# Patient Record
Sex: FEMALE | Race: BLACK OR AFRICAN AMERICAN
[De-identification: names, ages, dates, MRNs, and addresses within clinical notes are randomized per-mention and may not be internally consistent; named-entity substitution may affect disease eponyms.]

---

## 2022-03-18 PROBLEM — G47.00 INSOMNIA: Status: ACTIVE | Noted: 2017-01-05

## 2022-03-19 PROBLEM — G47.419 NARCOLEPSY: Status: ACTIVE | Noted: 2021-03-29

## 2022-06-10 ENCOUNTER — OFFICE VISIT (OUTPATIENT)
Dept: INTERNAL MEDICINE CLINIC | Facility: CLINIC | Age: 27
End: 2022-06-10
Payer: COMMERCIAL

## 2022-06-10 ENCOUNTER — NURSE ONLY (OUTPATIENT)
Dept: INTERNAL MEDICINE CLINIC | Facility: CLINIC | Age: 27
End: 2022-06-10

## 2022-06-10 VITALS
HEIGHT: 61 IN | HEART RATE: 67 BPM | OXYGEN SATURATION: 99 % | WEIGHT: 114 LBS | SYSTOLIC BLOOD PRESSURE: 100 MMHG | BODY MASS INDEX: 21.52 KG/M2 | TEMPERATURE: 97.5 F | DIASTOLIC BLOOD PRESSURE: 60 MMHG

## 2022-06-10 DIAGNOSIS — G47.419 PRIMARY NARCOLEPSY WITHOUT CATAPLEXY: ICD-10-CM

## 2022-06-10 DIAGNOSIS — G47.419 PRIMARY NARCOLEPSY WITHOUT CATAPLEXY: Primary | ICD-10-CM

## 2022-06-10 LAB — ABO + RH BLD: NORMAL

## 2022-06-10 PROCEDURE — 99214 OFFICE O/P EST MOD 30 MIN: CPT | Performed by: NURSE PRACTITIONER

## 2022-06-10 RX ORDER — TRIFAROTENE 50 UG/G
CREAM TOPICAL
COMMUNITY
Start: 2022-06-03

## 2022-06-10 RX ORDER — SOLRIAMFETOL 75 MG/1
1 TABLET, FILM COATED ORAL DAILY
COMMUNITY

## 2022-06-10 ASSESSMENT — ENCOUNTER SYMPTOMS
EYE DISCHARGE: 0
ABDOMINAL PAIN: 0
SINUS PAIN: 0
COUGH: 0
COLOR CHANGE: 0
EYE PAIN: 0
DIARRHEA: 0
ABDOMINAL DISTENTION: 0
NAUSEA: 0
CONSTIPATION: 0
SHORTNESS OF BREATH: 0
APNEA: 0
EYE ITCHING: 0
EYE REDNESS: 0
BACK PAIN: 0

## 2022-06-10 ASSESSMENT — PATIENT HEALTH QUESTIONNAIRE - PHQ9
1. LITTLE INTEREST OR PLEASURE IN DOING THINGS: 0
SUM OF ALL RESPONSES TO PHQ QUESTIONS 1-9: 0
SUM OF ALL RESPONSES TO PHQ9 QUESTIONS 1 & 2: 0
SUM OF ALL RESPONSES TO PHQ QUESTIONS 1-9: 0
2. FEELING DOWN, DEPRESSED OR HOPELESS: 0
SUM OF ALL RESPONSES TO PHQ QUESTIONS 1-9: 0
SUM OF ALL RESPONSES TO PHQ QUESTIONS 1-9: 0

## 2022-06-10 NOTE — PROGRESS NOTES
[facility]  [address] [place] [id number]      PROGRESS NOTE    SUBJECTIVE:   Vasquez Reyes is a 23 y.o. female seen for a follow up visit regarding the following chief complaint:     Chief Complaint   Patient presents with    Insomnia     9 month follow-up on narcolepsy. HPI     Patient presents for follow up sleep disorder. 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 She is compliant with this and symptoms seem improved somewhat on regimen. She continues to have daytime drowsiness and sleepiness. She falls asleep during the day hours still. She was recently referred to [facility] lung and critical care for further evaluation by her ENT for [facility] medication and is scheduled September for follow up. 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 She is working at [employer] currently at [employer]. Due for labs. She is very active and enjoys cycling, running and hot yoga. She does not drink caffeine. Current Outpatient Medications   Medication Sig Dispense Refill    AKLIEF 0.005 % CREA       Solriamfetol HCl (SUNOSI) 75 MG TABS Take 1 tablet by mouth daily      amoxicillin (AMOXIL) 500 MG capsule Take 500 mg by mouth daily      Benzoyl Peroxide (BENZAC AC) 10 % external wash WASH CHEST AND BACK EVERY DAY. ALLOW TO SIT 10 - 15 MINUTES PRIOR TO RINSING      clindamycin (CLINDAGEL) 1 % gel Apply topically 2 times daily       No current facility-administered medications for this visit. No Known Allergies    Past Medical History:   Diagnosis Date    Narcolepsy 03/2021     History reviewed. No pertinent surgical history.   Family History   Problem Relation Age of Onset    Elevated Lipids Mother     Hypertension Father      Social History     Tobacco Use    Smoking status: Never Smoker    Smokeless tobacco: Never Used   Substance Use Topics    Alcohol use: Yes     Comment: Socially       Review of Systems   Constitutional: Positive for fatigue. Negative for activity change, appetite change, chills, fever and unexpected weight change. HENT: Negative for congestion, ear pain and sinus pain. Eyes: Negative for pain, discharge, redness and itching. Respiratory: Negative for apnea, cough and shortness of breath. Cardiovascular: Negative for chest pain, palpitations and leg swelling. Gastrointestinal: Negative for abdominal distention, abdominal pain, constipation, diarrhea and nausea. Endocrine: Negative for cold intolerance and heat intolerance. Genitourinary: Negative for difficulty urinating, dysuria, enuresis and urgency. Musculoskeletal: Negative for arthralgias, back pain, joint swelling, myalgias and neck pain. Skin: Negative for color change and rash. Neurological: Negative for dizziness, weakness and headaches. Psychiatric/Behavioral: Negative for behavioral problems and sleep disturbance. The patient is not nervous/anxious. OBJECTIVE:  /60 (Site: Left Upper Arm, Position: Sitting, Cuff Size: Small Adult)   Pulse 67   Temp 97.5 °F (36.4 °C) (Temporal)   Ht 5' 1\" (1.549 m)   Wt 114 lb (51.7 kg)   SpO2 99%   BMI 21.54 kg/m²      Physical Exam  Constitutional:       General: She is not in acute distress. Appearance: Normal appearance. She is not ill-appearing or toxic-appearing. Cardiovascular:      Rate and Rhythm: Normal rate and regular rhythm. Pulmonary:      Effort: Pulmonary effort is normal. No respiratory distress. Skin:     General: Skin is warm and dry. Neurological:      Mental Status: She is alert. Mental status is at baseline. Psychiatric:         Mood and Affect: Mood normal.         Behavior: Behavior normal.         Thought Content:  Thought content normal.           ASSESSMENT and PLAN  Daniel New was seen today for insomnia. Diagnoses and all orders for this visit:    Primary narcolepsy without cataplexy  -     Vitamin D 25 Hydroxy; Future  -     TSH; Future  -     T4, Free; Future  -     CBC with Auto Differential; Future  -     Comprehensive Metabolic Panel; Future  -     Lipid Panel; Future  -     ABO/Rh; Future  -     Vitamin B12; Future    We discussed following with lung specialist as scheduled for continuation of sunosi as this seems to be managing narcolepsy fairly. We will obtain routine labs today and call with results. Greater than 50% of this 25 min visit was spent counseling the patient about test results, prognosis, importance of compliance, education about disease process, benefits of medications, instructions for management of acute symptoms, and follow up plans. Follow-up and Dispositions    · Return for labs today; .          Cheryl Montero NP, APRN - CNP

## 2022-06-11 LAB
25(OH)D3 SERPL-MCNC: 39.8 NG/ML (ref 30–100)
ALBUMIN SERPL-MCNC: 4.1 G/DL (ref 3.5–5)
ALBUMIN/GLOB SERPL: 1.2 {RATIO} (ref 1.2–3.5)
ALP SERPL-CCNC: 75 U/L (ref 50–136)
ALT SERPL-CCNC: 39 U/L (ref 12–65)
ANION GAP SERPL CALC-SCNC: 6 MMOL/L (ref 7–16)
AST SERPL-CCNC: 33 U/L (ref 15–37)
BASOPHILS # BLD: 0 K/UL (ref 0–0.2)
BASOPHILS NFR BLD: 1 % (ref 0–2)
BILIRUB SERPL-MCNC: 0.7 MG/DL (ref 0.2–1.1)
BUN SERPL-MCNC: 12 MG/DL (ref 6–23)
CALCIUM SERPL-MCNC: 10.2 MG/DL (ref 8.3–10.4)
CHLORIDE SERPL-SCNC: 105 MMOL/L (ref 98–107)
CHOLEST SERPL-MCNC: 217 MG/DL
CO2 SERPL-SCNC: 29 MMOL/L (ref 21–32)
CREAT SERPL-MCNC: 0.8 MG/DL (ref 0.6–1)
DIFFERENTIAL METHOD BLD: ABNORMAL
EOSINOPHIL # BLD: 0 K/UL (ref 0–0.8)
EOSINOPHIL NFR BLD: 0 % (ref 0.5–7.8)
ERYTHROCYTE [DISTWIDTH] IN BLOOD BY AUTOMATED COUNT: 12.4 % (ref 11.9–14.6)
GLOBULIN SER CALC-MCNC: 3.3 G/DL (ref 2.3–3.5)
GLUCOSE SERPL-MCNC: 75 MG/DL (ref 65–100)
HCT VFR BLD AUTO: 40.8 % (ref 35.8–46.3)
HDLC SERPL-MCNC: 71 MG/DL (ref 40–60)
HDLC SERPL: 3.1 {RATIO}
HGB BLD-MCNC: 13.3 G/DL (ref 11.7–15.4)
IMM GRANULOCYTES # BLD AUTO: 0 K/UL (ref 0–0.5)
IMM GRANULOCYTES NFR BLD AUTO: 0 % (ref 0–5)
LDLC SERPL CALC-MCNC: 136.6 MG/DL
LYMPHOCYTES # BLD: 1.5 K/UL (ref 0.5–4.6)
LYMPHOCYTES NFR BLD: 34 % (ref 13–44)
MCH RBC QN AUTO: 30.1 PG (ref 26.1–32.9)
MCHC RBC AUTO-ENTMCNC: 32.6 G/DL (ref 31.4–35)
MCV RBC AUTO: 92.3 FL (ref 79.6–97.8)
MONOCYTES # BLD: 0.4 K/UL (ref 0.1–1.3)
MONOCYTES NFR BLD: 8 % (ref 4–12)
NEUTS SEG # BLD: 2.6 K/UL (ref 1.7–8.2)
NEUTS SEG NFR BLD: 57 % (ref 43–78)
NRBC # BLD: 0 K/UL (ref 0–0.2)
PLATELET # BLD AUTO: 211 K/UL (ref 150–450)
PMV BLD AUTO: 12 FL (ref 9.4–12.3)
POTASSIUM SERPL-SCNC: 4.2 MMOL/L (ref 3.5–5.1)
PROT SERPL-MCNC: 7.4 G/DL (ref 6.3–8.2)
RBC # BLD AUTO: 4.42 M/UL (ref 4.05–5.2)
SODIUM SERPL-SCNC: 140 MMOL/L (ref 136–145)
T4 FREE SERPL-MCNC: 0.9 NG/DL (ref 0.9–1.8)
TRIGL SERPL-MCNC: 47 MG/DL (ref 35–150)
TSH, 3RD GENERATION: 0.76 UIU/ML (ref 0.36–3.74)
VIT B12 SERPL-MCNC: 1996 PG/ML (ref 193–986)
VLDLC SERPL CALC-MCNC: 9.4 MG/DL (ref 6–23)
WBC # BLD AUTO: 4.5 K/UL (ref 4.3–11.1)

## 2022-10-14 DIAGNOSIS — G47.419 NARCOLEPSY WITHOUT CATAPLEXY: Primary | ICD-10-CM

## 2022-10-14 DIAGNOSIS — G47.10 HYPERSOMNIA: ICD-10-CM

## 2022-12-01 NOTE — PROGRESS NOTES
[facility]  [address], [address] [place] [address] Court, [address] [facility]  [phone]    Patient Name:  Satish Guan  YOB: 1995      Office Visit 12/2/2022    CHIEF COMPLAINT:    Chief Complaint   Patient presents with    New Patient    Sleep Study    Results       HISTORY OF PRESENT ILLNESS:      The patient present in outpatient consultation at the request of Dr. Hassel Hammans, DOMIINC-CNP for evaluation and management of hypersomnia and possible sleep apnea    The patient underwent a diagnostic polysomnography last year in [place] because of symptoms including snoring, witnessed apneas, morning headaches, daytime sleepiness, and waking up with a dry mouth. Additional symptoms include grinding her teeth, have the tendency to doze off easily in the daytime. There is no history of cataplexy, hypnagogic hallucinations, or sleep paralysis. In addition, there is history of frequent leg movements, kicking at night, or an inability to keep the legs still. The diagnostic polysomnography was notable for a respiratory disturbance index of 7.0/hour but her AHI was 0.0. Oxygen saturations are low as 95% were noted. She had REM latency of 56 minutes of that study. Significant cardiac arrhythmias were not evident. The patient was noted to have in 5/hour limb movements with a limb movement arousal index being about 1.5/hour. A subsequent multiple sleep latency test was done which indicated she had mean sleep latency of 3 minutes and 44 seconds with a 3 sleep onset REM naps. She was started on modafinil and later on changed to [facility] which she is currently using on a daily basis with the dosage of 150 mg daily. She indicated that her daytime sleepiness is improving somewhat but she continues to have significant daytime sleepiness as documented by Philip score of 23 out of 24 today.   She stated that she works as an  she stated that her symptoms of snoring and witnessed apnea are getting worse and interestingly she never had any problem with her sleep when she was in high school or even in college and most of the symptoms started about a year or 2 ago. She goes to sleep around 10:30 PM and wake up around 6:30 AM daily. She denies any problem falling asleep or staying asleep. Indicated to her that her previous evaluation clearly suggest narcolepsy and will need to consider further treatment of that by using Xywav and adjunctive with the [facility]. At the same time we need to make sure she does not have underlying sleep apnea at least mild degree before we start Xywav. If she is found to have mild sleep apnea we will consider oral appliance as an alternative since she had history of bruxism and will be optimal treatment to treat both disorders. In fact, she has seen Dr. Mckeon I think in this regard and he told that she need to have documentation of sleep apnea before proceeding with any treatment with oral appliance I will provide her with a packet insert for Xywav to review with. She agreed to proceed with this plan at this point and we will refill Sunosi today. Sleepiness Scale:     Sitting and reading 3    Watching TV 3    Sitting inactive in a public place 3    As a passenger in a car for an hour without a break 3    Lying down to rest in the afternoon when circumstances Permits 3    Sitting and talking to someone 2    Sitting quietly after lunch without alcohol 3    In a car, while stopped for a few minutes 3    Total : 23/24     Past Medical History:   Diagnosis Date    Narcolepsy 03/2021       Patient Active Problem List   Diagnosis    Insomnia    Narcolepsy    Suspected sleep apnea    Bruxism, sleep-related    PLMD (periodic limb movement disorder)       History reviewed. No pertinent surgical history.     [unfilled]    Social History     Socioeconomic History    Marital status: Single     Spouse name: Not on file    Number of children: Not on file Years of education: Not on file    Highest education level: Not on file   Occupational History    Not on file   Tobacco Use    Smoking status: Never    Smokeless tobacco: Never   Vaping Use    Vaping Use: Never used   Substance and Sexual Activity    Alcohol use: Yes     Comment: Socially    Drug use: Never    Sexual activity: Not Currently   Other Topics Concern    Not on file   Social History Narrative    Not on file     Social Determinants of Health     Financial Resource Strain: Not on file   Food Insecurity: Not on file   Transportation Needs: Not on file   Physical Activity: Not on file   Stress: Not on file   Social Connections: Not on file   Intimate Partner Violence: Not on file   Housing Stability: Not on file         Family History   Problem Relation Age of Onset    Elevated Lipids Mother     Hypertension Father          No Known Allergies      Current Outpatient Medications   Medication Sig    Solriamfetol HCl 150 MG TABS Take 150 mg by mouth daily    AKLIEF 0.005 % CREA     amoxicillin (AMOXIL) 500 MG capsule Take 500 mg by mouth daily    Benzoyl Peroxide (BENZAC AC) 10 % external wash WASH CHEST AND BACK EVERY DAY. ALLOW TO SIT 10 - 15 MINUTES PRIOR TO RINSING    clindamycin (CLINDAGEL) 1 % gel Apply topically 2 times daily     No current facility-administered medications for this visit. REVIEW OF SYSTEMS:   CONSTITUTIONAL:   There is no history of fever, chills, night sweats, weight loss, weight gain, persistent fatigue, or lethargy/hypersomnolence. EYES:   Denies problems with eye pain, erythema, blurred vision, or visual field loss. ENTM:   Denies history of tinnitus, epistaxis, sore throat, hoarseness, or dysphonia. LYMPH:   Denies swollen glands. CARDIAC:   No chest pain, pressure, discomfort, palpitations, orthopnea, murmurs, or edema. GI:   No dysphagia, heartburn reflux, nausea/vomiting, diarrhea, abdominal pain, or bleeding.    :   Denies history of dysuria, hematuria, polyuria, or decreased urine output. MS:   No history of myalgias, arthralgias, bone pain, or muscle cramps. SKIN:   No history of rashes, jaundice, cyanosis, nodules, or ulcers. ENDO:   Negative for heat or cold intolerance. No history of DM. PSYCH:   Negative for anxiety, depression, insomnia, hallucinations. NEURO:   There is no history of AMS, persistent headache, decreased level of consciousness, seizures, or motor or sensory deficits. PHYSICAL EXAM:    Vitals:    12/02/22 0858   BP: 104/72   Pulse: 60   Resp: 14   Temp: 97 °F (36.1 °C)   SpO2: 100%        GENERAL APPEARANCE:   The patient is normal weight and in no respiratory distress. HEENT:   PERRL. Conjunctivae unremarkable. Nasal mucosa is without epistaxis, exudate, or polyps. Gums and dentition are unremarkable. There is  oropharyngeal narrowing. She is Mallampati 2   NECK/LYMPHATIC:   Symmetrical with no elevation of jugular venous pulsation. Trachea midline. No thyroid enlargement. No cervical adenopathy. LUNGS:   Normal respiratory effort with symmetrical lung expansion. Breath sounds are diminished in the bases. HEART:   There is a regular rate and rhythm. No murmur, rub, or gallop. There is no edema in the lower extremities. ABDOMEN:   Soft and non-tender. No hepatosplenomegaly. Bowel sounds are normal.     SKIN:   There are no rashes, cyanosis, jaundice, or ecchymosis present. EXTREMITIES:   The extremities are unremarkable without clubbing, cyanosis, joint inflammation, degenerative, or ischemic change. MUSCULOSKELETAL:   There is no abnormal tone, muscle atrophy, or abnormal movement present. NEURO:   The patient is alert and oriented to person, place, and time. Memory appears intact and mood is normal.  No gross sensorimotor deficits are present. DIAGNOSTIC TESTS:  Nps 3/2/21       ASSESSMENT:  (Medical Decision Making)         ICD-10-CM    1.  Primary narcolepsy without cataplexy ,    Her previous evaluation suggest narcolepsy without cataplexy and she has been treated with Sunosi with reasonable good response although not with resolution of her daytime sleepiness. It is certainly require further treatment with Xywav along with correcting any other sleep disorder including possible sleep apnea and bruxism. We will proceed with in lab study to exclude at least mild sleep apnea since her previous study showed definite upper airway resistance syndrome with elevated RDI and ongoing daytime sleepiness. She will be provided with the proper handout for [facility] for review in the meantime and will maintain Sunosi at 150 mg daily and this will be renewed G47.419 Solriamfetol HCl 150 MG TABS      2. Suspected sleep apnea, will proceed with in lab study to exclude sleep apnea and proceed with the treatment using oral appliance if this is positive for sleep apnea. R29.818 Ambulatory Referral to Sleep Studies      3. Bruxism, sleep-related, likely aggravated by possible sleep apnea and other sleep disruption G47.63       4. PLMD (periodic limb movement disorder), this was mild with no significant arousal and no signs of restless leg syndrome. G47.61              PLAN:    The patient be scheduled for in lab diagnostic sleep study to rule out sleep apnea.   If she has sleep apnea we will proceed with oral appliance evaluation with Dr. rToy Lombardi since she has seen him previously to consider oral appliance as a treatment modality for both sleep apnea and bruxism    Discussed with her other treatment options for narcolepsy including the addition of Xywav at nighttime to improve her nighttime sleep and and sleep quality overall to decrease her daytime sleepiness    Refill Sunosi    Provided the patient with proper sleep hygiene and education material on Xywav and hypersomnia    She will maintain her follow-up with the primary care provider    All questions and concerns were addressed according to her satisfaction    She will return to the sleep center in 4 months or sooner if needed      Orders Placed This Encounter   Procedures    Ambulatory Referral to Sleep Studies     Referral Priority:   Urgent     Referral Type:   Consult for Advice and Opinion     Referral Reason:   Specialty Services Required     Number of Visits Requested:   1                 Orders Placed This Encounter   Medications    Solriamfetol HCl 150 MG TABS     Sig: Take 150 mg by mouth daily     Dispense:  30 tablet     Refill:  3           Over 50% of today's office visit was spent in face to face time reviewing test results, prognosis, importance of compliance, education about disease process, benefits of medications, instructions for management of acute flare-ups, and follow up plans. Total face to face time spent with patient and charting was 60 minutes.     Pasquale Bravo MD  Electronically signed

## 2022-12-02 ENCOUNTER — OFFICE VISIT (OUTPATIENT)
Dept: SLEEP MEDICINE | Age: 27
End: 2022-12-02
Payer: COMMERCIAL

## 2022-12-02 VITALS
SYSTOLIC BLOOD PRESSURE: 104 MMHG | WEIGHT: 120 LBS | HEART RATE: 60 BPM | RESPIRATION RATE: 14 BRPM | DIASTOLIC BLOOD PRESSURE: 72 MMHG | TEMPERATURE: 97 F | HEIGHT: 62 IN | BODY MASS INDEX: 22.08 KG/M2 | OXYGEN SATURATION: 100 %

## 2022-12-02 DIAGNOSIS — G47.61 PLMD (PERIODIC LIMB MOVEMENT DISORDER): ICD-10-CM

## 2022-12-02 DIAGNOSIS — G47.419 PRIMARY NARCOLEPSY WITHOUT CATAPLEXY: Primary | ICD-10-CM

## 2022-12-02 DIAGNOSIS — G47.63 BRUXISM, SLEEP-RELATED: ICD-10-CM

## 2022-12-02 DIAGNOSIS — R29.818 SUSPECTED SLEEP APNEA: ICD-10-CM

## 2022-12-02 PROCEDURE — 99244 OFF/OP CNSLTJ NEW/EST MOD 40: CPT | Performed by: INTERNAL MEDICINE

## 2022-12-02 NOTE — PATIENT INSTRUCTIONS
Sleep Hygiene Instructions    Sleep only as much as you need to feel refreshed during the following day. Restricting your time in bed helps to consolidate and deepen your sleep. Excessively long times in bed lead to fragmented and shallow sleep. Get up at your regular time the next day, no matter how little your slept. Get up at the same time each day, 7 days a week. A regular wake time in the morning leads to regular times on sleep onset, and helps to set your biological clock. Exercise regularly. Schedule exercise times so that they do not occur within 3 hours of when you intend to go to bed. Exercise makes it easier to initiate sleep and deepen sleep. Don't take your problems to bed. Plan some time earlier in the evening for working on your problems or planning the next day's activities. Worrying may interfere with initiating sleep and produce shallow sleep. Train yourself to use the bedroom only for sleep and sexual activity. This will help condition your brain to see bed as the place for sleeping. Do not read, watch TV or eat in bed. Do not try and fall asleep. This only makes the problem worse. Instead, turn on the light, leave the bedroom, and do something different like reading a book. Don't engage in stimulating activity. Return to bed only when you feel sleepy. Avoid long naps. Staying awake during the day helps to fall asleep at night. Naps totalling more than 30 minutes increase your chances of having trouble sleeping at night. Make sure that your bedroom is comfortable and free from light and noise. A comfortable, noise-free sleep environment will reduce the likelihood that you will wake up during the night. Noise that does not awaken you may disturb the quality of your sleep. Carpeting, insulated curtains, and closing the door may help. Make sure that your bedroom is at a comfortable temperature during the night.  Excessively warm or cold sleep environments may disturb sleep. Eat regular meals and di not go to bed hungry. Hunger may disturb sleep. A light snack at bedtime (especially carbohydrates) may help sleep, but avoid greasy or heavy foods. Avoid excessive liquids in the evening. Reducing liquid intake will minimize the need for night-time trips to the bathroom. Cut down on all caffeine products. Caffeinated beverages and food (Coffee, tea, cola, chocolate) can cause difficulty falling asleep, awakenings during the night, and shallow sleep. Even caffeine early in the day can disrupt night-time sleep. Avoid alcohol, especially in the evening. Although alcohol helps tense people fall asleep more easily, it causes awakenings later in the night. Smoking may disturb sleep. Nicotine is a stimulant. Try not to smoke during the night when you have trouble sleeping. Learning about Sleeping Well    What does sleeping well mean? Sleeping well means getting enough sleep. How much sleep is enough varies among people. The number of hours you sleep is not as improtant as how you feel when you wake up. If you do not feel refreshed, you probably need more sleep. Another sign of not getting enough sleep is feeling tired during the day. The average totally nightly sleep time is 7 1/2 to 8 hours. Healthy adults may need a little more or a little less than this. Why is getting enough sleep important? Getting enough quality sleep is a basic part of good health. When your sleep suffers, your mood and your thoughts can suffer too. Your thoughts can suffer too. You ma find yourself feeling more grumpy or stressed. No getting enough sleep also can lead to serious problems, including injury, accidents, anxiety, and depression. What might cause poor sleeping? Many things can cause sleep problems, including:    Stress. Stress can be caused by fear about a single event, such as giving a speech.   Or you may have ongoing stress, such as worry about work or school. Depression, anxiety, and other mental or emotional conditions. Changes in your sleep habits or surroundings. This includes changes that happen where you sleep, such as noise, light, or sleeping in a different bed. It also includes changes in your sleep pattern, such as having jet lab or working a late shift. Health problems, such as pain, breathing problems, and restless legs syndrome. Lack of regular exercise. How can you help yourself? Here are some tips that may help you sleep more soundly and wake up feeling more refreshed. Your sleeping area    Use your bedroom only for sleeping and sex. A bit of light reading may help you fall asleep. But if it doesn't, do your reading elsewhere in the house. Don't watch TV in bed. Be sure your bed is big enough to stretch out comfortable, especially if you have a sleep partner. Keep your bedroom quiet, dark, and cool. Use curtains, blinds, or sleep mask to block out light. To block out noise, use earplugs, soothing music, or a \"white noise\" machine. Your evening and bedtime routine    Create a relaxing bedtime routine. You might want to take a warm shower or bath, listen to soothing music, or drink a cup of non-caffeinated tea. Go to bed at the same time every night. And get up at the same time every morning, even if you feel tired. What to avoid:    Limit caffeine (coffee, tea, caffeinated sodas) during the day, and dont have any for at least 4 to 6 hours before bedtime. Don't drink alcohol before bedtime. Alcohol can cause you to wake up more often during the night. Don't smoke or use tobacco, especially in the evening. Nicotine can keep you awake. Don't like in bed away for too long. If you can't fall asleep, or if you wake up in the middle of the night and can't get back to sleep within 15 minutes or so, get out of bed and go to another room until you feel sleepy.     Don't take medicine right before bed that may keep you awake or make you feel hyper or enegerized. Your doctor can tell you if your medicine may do this and if you can take it earlier in the day. If you can't sleep:    Imagine yourself in a peaceful, pleasant scene. Focus on the details and feelings of being in a place that is relaxing. Get up and do a quiet or boring activity until you feel sleepy. Don't drink liquids after 6 p.m. if you wake up often because you have to go to the bathroom. Where can you learn more? Go to http://sheryl-alvin.info/    Enter A350 in the search box to learn more about \"Learning About Sleeping Well. \"

## 2022-12-29 ENCOUNTER — HOSPITAL ENCOUNTER (OUTPATIENT)
Dept: SLEEP CENTER | Age: 27
End: 2022-12-29
Payer: COMMERCIAL

## 2022-12-29 PROCEDURE — 95810 POLYSOM 6/> YRS 4/> PARAM: CPT

## 2023-01-06 ENCOUNTER — TELEPHONE (OUTPATIENT)
Dept: SLEEP MEDICINE | Age: 28
End: 2023-01-06

## 2023-01-06 NOTE — TELEPHONE ENCOUNTER
Pt is asking if we have samples of      Disp Refills Start End    Solriamfetol HCl 150 MG TABS            She is in between her refills and new insurance and needs some till that time

## 2023-01-11 ENCOUNTER — TELEPHONE (OUTPATIENT)
Dept: SLEEP MEDICINE | Age: 28
End: 2023-01-11

## 2023-01-19 ENCOUNTER — TELEPHONE (OUTPATIENT)
Dept: SLEEP MEDICINE | Age: 28
End: 2023-01-19

## 2023-03-23 ENCOUNTER — TELEMEDICINE (OUTPATIENT)
Dept: INTERNAL MEDICINE CLINIC | Facility: CLINIC | Age: 28
End: 2023-03-23
Payer: COMMERCIAL

## 2023-03-23 DIAGNOSIS — G43.909 MIGRAINE WITHOUT STATUS MIGRAINOSUS, NOT INTRACTABLE, UNSPECIFIED MIGRAINE TYPE: Primary | ICD-10-CM

## 2023-03-23 PROCEDURE — 99213 OFFICE O/P EST LOW 20 MIN: CPT | Performed by: NURSE PRACTITIONER

## 2023-03-23 RX ORDER — SUMATRIPTAN 50 MG/1
50 TABLET, FILM COATED ORAL
Qty: 9 TABLET | Refills: 0 | Status: SHIPPED | OUTPATIENT
Start: 2023-03-23 | End: 2023-03-23

## 2023-03-23 RX ORDER — NORETHINDRONE ACETATE AND ETHINYL ESTRADIOL 1MG-20(21)
KIT ORAL
COMMUNITY
Start: 2023-03-08

## 2023-03-23 SDOH — ECONOMIC STABILITY: FOOD INSECURITY: WITHIN THE PAST 12 MONTHS, YOU WORRIED THAT YOUR FOOD WOULD RUN OUT BEFORE YOU GOT MONEY TO BUY MORE.: NEVER TRUE

## 2023-03-23 SDOH — ECONOMIC STABILITY: INCOME INSECURITY: HOW HARD IS IT FOR YOU TO PAY FOR THE VERY BASICS LIKE FOOD, HOUSING, MEDICAL CARE, AND HEATING?: NOT HARD AT ALL

## 2023-03-23 SDOH — ECONOMIC STABILITY: HOUSING INSECURITY
IN THE LAST 12 MONTHS, WAS THERE A TIME WHEN YOU DID NOT HAVE A STEADY PLACE TO SLEEP OR SLEPT IN A SHELTER (INCLUDING NOW)?: NO

## 2023-03-23 SDOH — ECONOMIC STABILITY: TRANSPORTATION INSECURITY
IN THE PAST 12 MONTHS, HAS LACK OF TRANSPORTATION KEPT YOU FROM MEETINGS, WORK, OR FROM GETTING THINGS NEEDED FOR DAILY LIVING?: NO

## 2023-03-23 SDOH — ECONOMIC STABILITY: FOOD INSECURITY: WITHIN THE PAST 12 MONTHS, THE FOOD YOU BOUGHT JUST DIDN'T LAST AND YOU DIDN'T HAVE MONEY TO GET MORE.: NEVER TRUE

## 2023-03-23 ASSESSMENT — ENCOUNTER SYMPTOMS
EYE PAIN: 0
COUGH: 0
NAUSEA: 1
PHOTOPHOBIA: 1
DIARRHEA: 0
ABDOMINAL PAIN: 0
SHORTNESS OF BREATH: 0
VOMITING: 0

## 2023-03-23 NOTE — PROGRESS NOTES
Edson Raza (:  1995) is a Established patient, here for evaluation of the following:    Assessment & Plan   Below is the assessment and plan developed based on review of pertinent history, physical exam, labs, studies, and medications. 1. Migraine without status migrainosus, not intractable, unspecified migraine type  -     SUMAtriptan (IMITREX) 50 MG tablet; Take 1 tablet by mouth once as needed for Migraine May repeat dose in 2 hours if needed. No more than 2 doses in 24-hour period. , Disp-9 tablet, R-0Normal    Trial of sumatriptan. Advised that this medication will likely cause drowsiness so recommended taking this evening before bed. Given underlying narcolepsy and potential for this medication to cause drowsiness, may need to consider alternative if medication is needed more often in the future. Encouraged healthy diet, increased water intake, adequate sleep, exercise and decreased stress. Call if symptoms worsen or do not improve. In-office appt only if follow-up on this problem is needed. Return if symptoms worsen or fail to improve. Subjective   HPI  Patient seen virtually today with complaint of what she believes to be a migraine. Symptoms started Monday. The pain is located in the left occipital region and into the left neck. Tylenol and Advil alleviate the pain somewhat but have not completely resolved the headache. The pain ranges from a dull ache (3/10 on pain scale) to a throbbing pain (9/10 on pain scale). She reports associated vision disturbance and photophobia. Had mild nausea yesterday which has since resolved. Denies recent fever or illness. No trouble with range of motion of neck. She reports history of mild headaches which she has attributed to her narcolepsy. They are typically frontal or occipital in location. Typically respond well to rest and Advil or Tylenol. She denies any recent changes to diet, medication, stress, sleep or hydration.        Review of

## 2023-04-15 DIAGNOSIS — G47.419 PRIMARY NARCOLEPSY WITHOUT CATAPLEXY: ICD-10-CM

## 2023-04-18 ENCOUNTER — OFFICE VISIT (OUTPATIENT)
Dept: PULMONOLOGY | Age: 28
End: 2023-04-18
Payer: COMMERCIAL

## 2023-04-18 VITALS
HEART RATE: 75 BPM | TEMPERATURE: 97.2 F | HEIGHT: 61 IN | DIASTOLIC BLOOD PRESSURE: 80 MMHG | WEIGHT: 121 LBS | BODY MASS INDEX: 22.84 KG/M2 | SYSTOLIC BLOOD PRESSURE: 117 MMHG | OXYGEN SATURATION: 100 % | RESPIRATION RATE: 14 BRPM

## 2023-04-18 DIAGNOSIS — G47.419 PRIMARY NARCOLEPSY WITHOUT CATAPLEXY: ICD-10-CM

## 2023-04-18 PROCEDURE — 99215 OFFICE O/P EST HI 40 MIN: CPT | Performed by: INTERNAL MEDICINE

## 2023-04-18 ASSESSMENT — SLEEP AND FATIGUE QUESTIONNAIRES
HOW LIKELY ARE YOU TO NOD OFF OR FALL ASLEEP WHILE LYING DOWN TO REST IN THE AFTERNOON WHEN CIRCUMSTANCES PERMIT: 3
HOW LIKELY ARE YOU TO NOD OFF OR FALL ASLEEP WHILE SITTING QUIETLY AFTER LUNCH WITHOUT ALCOHOL: 3
HOW LIKELY ARE YOU TO NOD OFF OR FALL ASLEEP WHEN YOU ARE A PASSENGER IN A CAR FOR AN HOUR WITHOUT A BREAK: 3
HOW LIKELY ARE YOU TO NOD OFF OR FALL ASLEEP WHILE SITTING AND TALKING TO SOMEONE: 2
HOW LIKELY ARE YOU TO NOD OFF OR FALL ASLEEP IN A CAR, WHILE STOPPED FOR A FEW MINUTES IN TRAFFIC: 3
HOW LIKELY ARE YOU TO NOD OFF OR FALL ASLEEP WHILE SITTING AND READING: 3
ESS TOTAL SCORE: 22
HOW LIKELY ARE YOU TO NOD OFF OR FALL ASLEEP WHILE WATCHING TV: 3
HOW LIKELY ARE YOU TO NOD OFF OR FALL ASLEEP WHILE SITTING INACTIVE IN A PUBLIC PLACE: 2

## 2023-04-18 NOTE — PROGRESS NOTES
[facility]  [address] [place] [address] Court, [address] [facility]  [phone]    Patient Name:  Oscar Guajardo  YOB: 1995      Office Visit 4/18/2023    CHIEF COMPLAINT:    Chief Complaint   Patient presents with    Follow-up       HISTORY OF PRESENT ILLNESS:      The patient present for management of hypersomnia and narcolepsy    The patient underwent a diagnostic polysomnography last year in [place]. The diagnostic polysomnography was notable for a respiratory disturbance index of 7.0/hour but her AHI was 0.0. Oxygen saturations are low as 95% were noted. She had REM latency of 56 minutes of that study. Significant cardiac arrhythmias were not evident. The patient was noted to have in 5/hour limb movements with a limb movement arousal index being about 1.5/hour. A subsequent multiple sleep latency test was done which indicated she had mean sleep latency of 3 minutes and 44 seconds with a 3 sleep onset REM naps. She was started on modafinil and later on changed to [facility] which she is currently using on a daily basis with the dosage of 150 mg daily. She indicated that her daytime sleepiness is improving somewhat but she continues to have significant daytime sleepiness as documented by Atkinson score of 22 out of 24 today. 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 She goes to sleep around 10:30 PM and wake up around 6:30 AM daily. She denies any problem falling asleep or staying asleep. Since her last visit, she had follow-up sleep study which indicated she does have snoring and upper airway resistance syndrome with an AHI of 0.3/hour. 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   She reviewed the package insert for

## 2023-04-19 RX ORDER — SOLRIAMFETOL 150 MG/1
TABLET, FILM COATED ORAL
Qty: 30 TABLET | OUTPATIENT
Start: 2023-04-19

## 2023-04-21 ENCOUNTER — TELEPHONE (OUTPATIENT)
Dept: SLEEP MEDICINE | Age: 28
End: 2023-04-21

## 2023-04-21 DIAGNOSIS — G47.419 PRIMARY NARCOLEPSY WITHOUT CATAPLEXY: ICD-10-CM

## 2023-04-21 RX ORDER — SOLRIAMFETOL 150 MG/1
TABLET, FILM COATED ORAL
Qty: 30 TABLET | Refills: 5 | Status: SHIPPED | OUTPATIENT
Start: 2023-04-21

## 2023-05-01 ENCOUNTER — TELEPHONE (OUTPATIENT)
Dept: SLEEP MEDICINE | Age: 28
End: 2023-05-01

## 2023-05-05 NOTE — TELEPHONE ENCOUNTER
The patient called today stating that she still has not received her medication sunosi, and per the patient the prior authorization has not been done.

## 2023-05-05 NOTE — TELEPHONE ENCOUNTER
Patient's PA was submitted for sunosi today. It is filed under maiden name [facility].

## 2023-10-11 ENCOUNTER — TELEPHONE (OUTPATIENT)
Dept: SLEEP MEDICINE | Age: 28
End: 2023-10-11

## 2023-10-11 NOTE — TELEPHONE ENCOUNTER
Pharmacy called in reference to PA for [facility].

## 2023-11-07 DIAGNOSIS — G47.419 PRIMARY NARCOLEPSY WITHOUT CATAPLEXY: ICD-10-CM

## 2023-11-20 RX ORDER — SOLRIAMFETOL 150 MG/1
TABLET, FILM COATED ORAL
Qty: 30 TABLET | OUTPATIENT
Start: 2023-11-20

## 2024-09-04 ENCOUNTER — TELEPHONE (OUTPATIENT)
Dept: INTERNAL MEDICINE CLINIC | Facility: CLINIC | Age: 29
End: 2024-09-04

## 2024-09-04 NOTE — TELEPHONE ENCOUNTER
Lvm to schedule appt per pt request for muscle and joint concerns. Pt can have acute visit w/NP, but will also need to set up NTP to get set up with a provider.

## 2024-10-14 ENCOUNTER — OFFICE VISIT (OUTPATIENT)
Dept: ORTHOPEDIC SURGERY | Age: 29
End: 2024-10-14
Payer: COMMERCIAL

## 2024-10-14 DIAGNOSIS — R29.898 HIP WEAKNESS: ICD-10-CM

## 2024-10-14 DIAGNOSIS — M25.551 BILATERAL HIP PAIN: Primary | ICD-10-CM

## 2024-10-14 DIAGNOSIS — M25.552 BILATERAL HIP PAIN: Primary | ICD-10-CM

## 2024-10-14 DIAGNOSIS — M76.899 HIP FLEXOR TENDINITIS, UNSPECIFIED LATERALITY: ICD-10-CM

## 2024-10-14 PROCEDURE — 99204 OFFICE O/P NEW MOD 45 MIN: CPT | Performed by: STUDENT IN AN ORGANIZED HEALTH CARE EDUCATION/TRAINING PROGRAM

## 2024-10-14 NOTE — PROGRESS NOTES
Name: Chiqui Payan  YOB: 1995  Gender: female  MRN: [id number]  Date of Encounter:  10/14/2024       CHIEF COMPLAINT:     Chief Complaint   Patient presents with    Hip Pain     Bilateral        SUBJECTIVE/OBJECTIVE:      HPI:    Patient is a 28 y.o. pleasant female who presents today for a new evaluation of her bilateral hip pain.     Ms. Payan has a hx of narcolepsy. She presents for bilateral hip pain that has been present intermittently for the past 2 years.  She felt like it was time to finally get this evaluated.  She has pain over her iliac crest.  She notes that this is an aching, more commonly at night, sometimes exacerbated by increased exercise.  She runs occasionally and does yoga.  She denies prior injury.  She notes right knee pain as well at times and pain into her back.  She denies radiation of pain down the legs, numbness or tingling.  She intermittently takes NSAIDs for pain relief which gave some mild improvement.     PAST HISTORY:   Past medical, surgical, family, social history and allergies reviewed by me.   Pertinent history:   Tobacco use:  reports that she has never smoked. She has never used smokeless tobacco.  Diabetes: none  Anticoagulation: no      REVIEW OF SYSTEMS:   As noted in HPI.     PHYSICAL EXAMINATION:     Gen: Well-developed, no acute distress   HEENT: NC/AT, EOMI   Neck: Trachea midline, normal ROM   CV: Regular rhythm by palpation of distal pulse, normal capillary refill   Pulm: No respiratory distress, no stridor   Psychiatric: Well oriented, normal mood and affect.   Skin: No rashes, lesions or ulcers, normal temperature, turgor, and texture on uninvolved extremity.      ORTHO EXAM:    Bilateral Hip:      No deformity.  Symmetrical hip flexion 110, internal rotation 30, external rotation 45.  There is mild tenderness over the ASIS and the AIIS bilateral hips.  Right SI joint is tender to palpation.  Greater trochanters and ischial tuberosity are

## 2025-03-15 NOTE — TELEPHONE ENCOUNTER
Is this because of cost? If so, she needs to apply for patient assistance. Stephanie last filled 12/6. We can provide samples but she can't fill her prescription if we give samples.      Yanique Gregory, DOMINIC - CNP Assistance with ambulation/Assistance OOB with selected safe patient handling equipment/Communicate Risk of Fall with Harm to all staff/Reinforce activity limits and safety measures with patient and family/Tailored Fall Risk Interventions/Visual Cue: Yellow wristband and red socks/Bed in lowest position, wheels locked, appropriate side rails in place/Call bell, personal items and telephone in reach/Instruct patient to call for assistance before getting out of bed or chair/Non-slip footwear when patient is out of bed/[place] to call system/Physically safe environment - no spills, clutter or unnecessary equipment/Purposeful Proactive Rounding/Room/bathroom lighting operational, light cord in reach